# Patient Record
Sex: MALE | Race: WHITE | HISPANIC OR LATINO | URBAN - METROPOLITAN AREA
[De-identification: names, ages, dates, MRNs, and addresses within clinical notes are randomized per-mention and may not be internally consistent; named-entity substitution may affect disease eponyms.]

---

## 2017-04-27 ENCOUNTER — NEW PATIENT (OUTPATIENT)
Dept: URBAN - METROPOLITAN AREA CLINIC 60 | Facility: CLINIC | Age: 77
End: 2017-04-27
Payer: MEDICARE

## 2017-04-27 DIAGNOSIS — H16.223 KERATOCONJUNCTIVITIS SICCA, BILATERAL: Primary | ICD-10-CM

## 2017-04-27 DIAGNOSIS — E11.9 TYPE 2 DIABETES MELLITUS WITHOUT COMPLICATIONS: ICD-10-CM

## 2017-04-27 DIAGNOSIS — H25.13 AGE-RELATED NUCLEAR CATARACT, BILATERAL: ICD-10-CM

## 2017-04-27 DIAGNOSIS — H52.4 PRESBYOPIA: ICD-10-CM

## 2017-04-27 PROCEDURE — 92004 COMPRE OPH EXAM NEW PT 1/>: CPT | Performed by: OPTOMETRIST

## 2017-04-27 PROCEDURE — 92015 DETERMINE REFRACTIVE STATE: CPT | Performed by: OPTOMETRIST

## 2017-04-27 ASSESSMENT — INTRAOCULAR PRESSURE
OD: 13
OS: 13

## 2017-04-27 ASSESSMENT — VISUAL ACUITY
OD: 20/25
OS: 20/25

## 2019-05-31 ENCOUNTER — FOLLOW UP ESTABLISHED (OUTPATIENT)
Dept: URBAN - METROPOLITAN AREA CLINIC 59 | Facility: CLINIC | Age: 79
End: 2019-05-31
Payer: COMMERCIAL

## 2019-05-31 PROCEDURE — 92014 COMPRE OPH EXAM EST PT 1/>: CPT | Performed by: OPTOMETRIST

## 2019-05-31 ASSESSMENT — INTRAOCULAR PRESSURE
OS: 13
OD: 14

## 2019-05-31 ASSESSMENT — VISUAL ACUITY
OD: 20/20
OS: 20/20

## 2020-06-01 ENCOUNTER — FOLLOW UP ESTABLISHED (OUTPATIENT)
Dept: URBAN - METROPOLITAN AREA CLINIC 59 | Facility: CLINIC | Age: 80
End: 2020-06-01
Payer: COMMERCIAL

## 2020-06-01 DIAGNOSIS — H52.13 MYOPIA, BILATERAL: ICD-10-CM

## 2020-06-01 PROCEDURE — 92015 DETERMINE REFRACTIVE STATE: CPT | Performed by: OPTOMETRIST

## 2020-06-01 PROCEDURE — 92014 COMPRE OPH EXAM EST PT 1/>: CPT | Performed by: OPTOMETRIST

## 2020-06-01 ASSESSMENT — INTRAOCULAR PRESSURE
OD: 15
OS: 14

## 2020-06-01 ASSESSMENT — VISUAL ACUITY
OS: 20/20
OD: 20/20

## 2021-05-27 ENCOUNTER — OFFICE VISIT (OUTPATIENT)
Dept: URBAN - METROPOLITAN AREA CLINIC 59 | Facility: CLINIC | Age: 81
End: 2021-05-27
Payer: MEDICARE

## 2021-05-27 PROCEDURE — 92014 COMPRE OPH EXAM EST PT 1/>: CPT | Performed by: OPTOMETRIST

## 2021-05-27 ASSESSMENT — INTRAOCULAR PRESSURE
OS: 14
OD: 14

## 2021-05-27 NOTE — IMPRESSION/PLAN
Impression: Age-related nuclear cataract, bilateral Plan: Discussed diagnosis of cataracts with patient. Patient has no significant visual complaints at this time and is able to perform all their daily activities. We will continue to monitor yearly unless patient notes any changes sooner.

## 2021-05-27 NOTE — IMPRESSION/PLAN
Impression: Keratoconjunctivitis sicca, bilateral Plan: Lissamine green staining performed today. Encouraged patient to increase frequency of artificial tear use OU.

## 2022-05-23 ENCOUNTER — OFFICE VISIT (OUTPATIENT)
Dept: URBAN - METROPOLITAN AREA CLINIC 59 | Facility: CLINIC | Age: 82
End: 2022-05-23
Payer: COMMERCIAL

## 2022-05-23 DIAGNOSIS — H25.13 AGE-RELATED NUCLEAR CATARACT, BILATERAL: ICD-10-CM

## 2022-05-23 DIAGNOSIS — H43.393 OTHER VITREOUS OPACITIES, BILATERAL: ICD-10-CM

## 2022-05-23 DIAGNOSIS — H16.223 KERATOCONJUNCTIVITIS SICCA, BILATERAL: Primary | ICD-10-CM

## 2022-05-23 PROCEDURE — 92014 COMPRE OPH EXAM EST PT 1/>: CPT | Performed by: OPTOMETRIST

## 2022-05-23 ASSESSMENT — INTRAOCULAR PRESSURE
OS: 15
OD: 15

## 2022-05-23 NOTE — IMPRESSION/PLAN
Impression: Keratoconjunctivitis sicca, bilateral: E20.291. Plan: Patient to continue artificial tears increasing to QID OU.

## 2023-05-25 ENCOUNTER — OFFICE VISIT (OUTPATIENT)
Dept: URBAN - METROPOLITAN AREA CLINIC 59 | Facility: CLINIC | Age: 83
End: 2023-05-25
Payer: COMMERCIAL

## 2023-05-25 DIAGNOSIS — H25.13 AGE-RELATED NUCLEAR CATARACT, BILATERAL: Primary | ICD-10-CM

## 2023-05-25 DIAGNOSIS — H16.223 KERATOCONJUNCTIVITIS SICCA, BILATERAL: ICD-10-CM

## 2023-05-25 DIAGNOSIS — H43.393 OTHER VITREOUS OPACITIES, BILATERAL: ICD-10-CM

## 2023-05-25 DIAGNOSIS — H02.052 TRICHIASIS WITHOUT ENTROPION RIGHT LOWER EYELID: ICD-10-CM

## 2023-05-25 PROCEDURE — 99214 OFFICE O/P EST MOD 30 MIN: CPT | Performed by: OPTOMETRIST

## 2023-05-25 PROCEDURE — 67820 REVISE EYELASHES: CPT | Performed by: OPTOMETRIST

## 2023-05-25 ASSESSMENT — INTRAOCULAR PRESSURE
OS: 12
OD: 12

## 2023-05-25 NOTE — IMPRESSION/PLAN
Impression: Trichiasis without entropion right lower eyelid: H02.052. Plan: Patient educated on findings. 1  lash removed at slit lamp today. Patient educated that if this happens again he can call the office to have lashes removed.

## 2023-05-25 NOTE — IMPRESSION/PLAN
Impression: Keratoconjunctivitis sicca, bilateral: U37.033. Plan: Discussed findings in detail with patient. Recommend patient to use artificial tears to help alleviate dry eye symptoms, gave patient name of Toan.

## 2024-05-30 ENCOUNTER — OFFICE VISIT (OUTPATIENT)
Dept: URBAN - METROPOLITAN AREA CLINIC 59 | Facility: CLINIC | Age: 84
End: 2024-05-30
Payer: COMMERCIAL

## 2024-05-30 DIAGNOSIS — H02.051 TRICHIASIS WITHOUT ENTROPIAN RIGHT UPPER EYELID: ICD-10-CM

## 2024-05-30 DIAGNOSIS — H43.393 OTHER VITREOUS OPACITIES, BILATERAL: ICD-10-CM

## 2024-05-30 DIAGNOSIS — H16.223 KERATOCONJUNCTIVITIS SICCA, BILATERAL: ICD-10-CM

## 2024-05-30 DIAGNOSIS — H25.13 AGE-RELATED NUCLEAR CATARACT, BILATERAL: Primary | ICD-10-CM

## 2024-05-30 PROCEDURE — 67820 REVISE EYELASHES: CPT | Performed by: OPTOMETRIST

## 2024-05-30 PROCEDURE — 92014 COMPRE OPH EXAM EST PT 1/>: CPT | Performed by: OPTOMETRIST

## 2024-05-30 ASSESSMENT — INTRAOCULAR PRESSURE
OD: 16
OS: 15